# Patient Record
Sex: FEMALE
[De-identification: names, ages, dates, MRNs, and addresses within clinical notes are randomized per-mention and may not be internally consistent; named-entity substitution may affect disease eponyms.]

---

## 2021-02-22 ENCOUNTER — APPOINTMENT (OUTPATIENT)
Dept: OBGYN | Facility: CLINIC | Age: 61
End: 2021-02-22
Payer: MEDICAID

## 2021-02-22 VITALS
BODY MASS INDEX: 25.34 KG/M2 | HEIGHT: 63 IN | DIASTOLIC BLOOD PRESSURE: 85 MMHG | WEIGHT: 143 LBS | SYSTOLIC BLOOD PRESSURE: 190 MMHG | HEART RATE: 72 BPM | RESPIRATION RATE: 14 BRPM

## 2021-02-22 DIAGNOSIS — Z80.0 FAMILY HISTORY OF MALIGNANT NEOPLASM OF DIGESTIVE ORGANS: ICD-10-CM

## 2021-02-22 DIAGNOSIS — Z86.79 PERSONAL HISTORY OF OTHER DISEASES OF THE CIRCULATORY SYSTEM: ICD-10-CM

## 2021-02-22 DIAGNOSIS — Z83.3 FAMILY HISTORY OF DIABETES MELLITUS: ICD-10-CM

## 2021-02-22 DIAGNOSIS — R39.9 UNSPECIFIED SYMPTOMS AND SIGNS INVOLVING THE GENITOURINARY SYSTEM: ICD-10-CM

## 2021-02-22 PROBLEM — Z00.00 ENCOUNTER FOR PREVENTIVE HEALTH EXAMINATION: Status: ACTIVE | Noted: 2021-02-22

## 2021-02-22 LAB
BILIRUB UR QL STRIP: NEGATIVE
CLARITY UR: NORMAL
COLLECTION METHOD: NORMAL
GLUCOSE UR-MCNC: NEGATIVE
HCG UR QL: 0.2 EU/DL
HGB UR QL STRIP.AUTO: NORMAL
KETONES UR-MCNC: NEGATIVE
LEUKOCYTE ESTERASE UR QL STRIP: NORMAL
NITRITE UR QL STRIP: NEGATIVE
PH UR STRIP: 7
PROT UR STRIP-MCNC: 30
SP GR UR STRIP: 1.1

## 2021-02-22 PROCEDURE — 99213 OFFICE O/P EST LOW 20 MIN: CPT | Mod: 25

## 2021-02-22 PROCEDURE — 82270 OCCULT BLOOD FECES: CPT

## 2021-02-22 PROCEDURE — 99386 PREV VISIT NEW AGE 40-64: CPT

## 2021-02-22 PROCEDURE — 81003 URINALYSIS AUTO W/O SCOPE: CPT | Mod: QW

## 2021-02-22 PROCEDURE — 99072 ADDL SUPL MATRL&STAF TM PHE: CPT

## 2021-02-22 NOTE — HISTORY OF PRESENT ILLNESS
[FreeTextEntry1] : 61 yo no hot flashes, sexually active and has vaginal dryness SHe is also complaining of a lot of urinary urgency and burning x 1 week.  [Currently Active] : currently active [Men] : men [Vaginal] : vaginal [No] : No

## 2021-02-22 NOTE — PHYSICAL EXAM
[Appropriately responsive] : appropriately responsive [Alert] : alert [No Acute Distress] : no acute distress [No Lymphadenopathy] : no lymphadenopathy [No Murmurs] : no murmurs [Soft] : soft [Non-tender] : non-tender [Non-distended] : non-distended [No HSM] : No HSM [No Lesions] : no lesions [No Mass] : no mass [Oriented x3] : oriented x3 [Examination Of The Breasts] : a normal appearance [No Masses] : no breast masses were palpable [Labia Majora] : normal [Labia Minora] : normal [Normal] : normal [Uterine Adnexae] : normal [No Tenderness] : no tenderness [Nl Sphincter Tone] : normal sphincter tone [FreeTextEntry9] : -ciriloc,-mass

## 2021-02-25 LAB — HPV HIGH+LOW RISK DNA PNL CVX: NOT DETECTED

## 2022-02-28 ENCOUNTER — APPOINTMENT (OUTPATIENT)
Dept: OBGYN | Facility: CLINIC | Age: 62
End: 2022-02-28
Payer: MEDICAID

## 2022-02-28 VITALS — DIASTOLIC BLOOD PRESSURE: 118 MMHG | BODY MASS INDEX: 26.04 KG/M2 | WEIGHT: 147 LBS | SYSTOLIC BLOOD PRESSURE: 160 MMHG

## 2022-02-28 DIAGNOSIS — Z78.0 ASYMPTOMATIC MENOPAUSAL STATE: ICD-10-CM

## 2022-02-28 PROCEDURE — 99396 PREV VISIT EST AGE 40-64: CPT

## 2022-02-28 PROCEDURE — 82270 OCCULT BLOOD FECES: CPT

## 2022-02-28 RX ORDER — NITROFURANTOIN (MONOHYDRATE/MACROCRYSTALS) 25; 75 MG/1; MG/1
100 CAPSULE ORAL
Qty: 14 | Refills: 0 | Status: COMPLETED | COMMUNITY
Start: 2021-02-22 | End: 2022-02-28

## 2022-02-28 RX ORDER — PHENAZOPYRIDINE HYDROCHLORIDE 200 MG/1
200 TABLET ORAL 3 TIMES DAILY
Qty: 6 | Refills: 0 | Status: COMPLETED | COMMUNITY
Start: 2021-02-22 | End: 2022-02-28

## 2022-02-28 NOTE — PHYSICAL EXAM

## 2022-02-28 NOTE — HISTORY OF PRESENT ILLNESS
[Currently Active] : currently active [Men] : men [Vaginal] : vaginal [No] : No [Patient reported colonoscopy was normal] : Patient reported colonoscopy was normal [FreeTextEntry1] : 60 yo here for av  she notices that she has some urge incontinence occ, some vag dryness., uses coconut oil disc urogyn dr drake\par pt hasn’t been getting mammos.  [ColonoscopyDate] : 15 yrs ago

## 2022-02-28 NOTE — DISCUSSION/SUMMARY
[FreeTextEntry1] : disc with pt her elevated BP she should see Dr. West and disc this. She does natural things for the bp.\par Discussed with the pt the importance of exercise weight bearing,yoga, aerobics good variety, \par calcium totalling  mg between food and vitamins also vitamin D 2000iu daily, fish oil. \par ALso that any drop of blood after menopause is not good. Encouraged SBE\par FU in one year. \par  Discussed vaginal lubricants OTC like luvena moisturizers,ky ovule and relplense . There are also vaginal estrogens, and coconut oil with intercourse.\par discussed colonoscopy and derma appt.is going to make appt, \par

## 2022-12-01 ENCOUNTER — RX ONLY (RX ONLY)
Age: 62
End: 2022-12-01

## 2022-12-01 ENCOUNTER — OFFICE (OUTPATIENT)
Dept: URBAN - METROPOLITAN AREA CLINIC 104 | Facility: CLINIC | Age: 62
Setting detail: OPHTHALMOLOGY
End: 2022-12-01
Payer: MEDICAID

## 2022-12-01 DIAGNOSIS — H25.13: ICD-10-CM

## 2022-12-01 DIAGNOSIS — H43.393: ICD-10-CM

## 2022-12-01 DIAGNOSIS — H04.123: ICD-10-CM

## 2022-12-01 PROCEDURE — 83861 MICROFLUID ANALY TEARS: CPT | Performed by: SPECIALIST

## 2022-12-01 PROCEDURE — 99213 OFFICE O/P EST LOW 20 MIN: CPT | Performed by: SPECIALIST

## 2022-12-01 ASSESSMENT — KERATOMETRY
OD_AXISANGLE_DEGREES: 089
OS_AXISANGLE_DEGREES: 106
OS_K1POWER_DIOPTERS: 42.88
OD_K1POWER_DIOPTERS: 43.16
OD_K2POWER_DIOPTERS: 43.44
OS_K2POWER_DIOPTERS: 43.49

## 2022-12-01 ASSESSMENT — AXIALLENGTH_DERIVED
OS_AL: 22.9006
OD_AL: 23.23
OD_AL: 23.23
OS_AL: 22.9006

## 2022-12-01 ASSESSMENT — REFRACTION_MANIFEST
OS_ADD: +2.00
OS_SPHERE: +2.25
OD_AXIS: 085
OD_CYLINDER: -0.25
OS_VA1: 20/20
OS_CYLINDER: -0.25
OU_VA: 20/20
OD_VA1: 20/20
OD_ADD: +2.00
OS_AXIS: 070
OD_SPHERE: +1.25

## 2022-12-01 ASSESSMENT — REFRACTION_AUTOREFRACTION
OD_AXIS: 084
OD_CYLINDER: -0.25
OS_AXIS: 072
OS_SPHERE: +2.25
OD_SPHERE: +1.25
OS_CYLINDER: -0.25

## 2022-12-01 ASSESSMENT — SPHEQUIV_DERIVED
OD_SPHEQUIV: 1.125
OD_SPHEQUIV: 1.125
OS_SPHEQUIV: 2.125
OS_SPHEQUIV: 2.125

## 2022-12-01 ASSESSMENT — REFRACTION_CURRENTRX
OS_OVR_VA: 20/
OS_SPHERE: +1.75
OD_SPHERE: +1.75
OD_OVR_VA: 20/

## 2022-12-01 ASSESSMENT — CONFRONTATIONAL VISUAL FIELD TEST (CVF)
OD_FINDINGS: FULL
OS_FINDINGS: FULL

## 2022-12-01 ASSESSMENT — TEAR BREAK UP TIME (TBUT)
OS_TBUT: 1+
OD_TBUT: 1+

## 2022-12-01 ASSESSMENT — VISUAL ACUITY
OD_BCVA: 20/30-1
OS_BCVA: 20/50-1

## 2022-12-01 ASSESSMENT — TONOMETRY
OS_IOP_MMHG: 15
OD_IOP_MMHG: 15

## 2023-03-06 ENCOUNTER — APPOINTMENT (OUTPATIENT)
Dept: OBGYN | Facility: CLINIC | Age: 63
End: 2023-03-06

## 2023-04-10 ENCOUNTER — APPOINTMENT (OUTPATIENT)
Dept: OBGYN | Facility: CLINIC | Age: 63
End: 2023-04-10
Payer: MEDICAID

## 2023-04-10 VITALS
BODY MASS INDEX: 24.1 KG/M2 | DIASTOLIC BLOOD PRESSURE: 90 MMHG | HEART RATE: 70 BPM | SYSTOLIC BLOOD PRESSURE: 158 MMHG | HEIGHT: 63 IN | RESPIRATION RATE: 16 BRPM | WEIGHT: 136 LBS

## 2023-04-10 DIAGNOSIS — Z12.11 ENCOUNTER FOR SCREENING FOR MALIGNANT NEOPLASM OF COLON: ICD-10-CM

## 2023-04-10 DIAGNOSIS — N95.0 POSTMENOPAUSAL BLEEDING: ICD-10-CM

## 2023-04-10 PROCEDURE — 82270 OCCULT BLOOD FECES: CPT

## 2023-04-10 PROCEDURE — 99396 PREV VISIT EST AGE 40-64: CPT

## 2023-04-10 NOTE — HISTORY OF PRESENT ILLNESS
[FreeTextEntry1] : 63yo here for av never got jad and dexa , rx written today. \par No  bleeding in the last 1.5 ys she hasn’t had any spotting for a while now . + vaginal dryness. [Currently Active] : currently active

## 2023-04-10 NOTE — PHYSICAL EXAM
[Chaperone Declined] : Patient declined chaperone [Appropriately responsive] : appropriately responsive [Alert] : alert [No Acute Distress] : no acute distress [No Lymphadenopathy] : no lymphadenopathy [Soft] : soft [Non-tender] : non-tender [Non-distended] : non-distended [No HSM] : No HSM [No Lesions] : no lesions [No Mass] : no mass [Oriented x3] : oriented x3 [Examination Of The Breasts] : a normal appearance [No Masses] : no breast masses were palpable [Labia Majora] : normal [Labia Minora] : normal [Normal] : normal [Uterine Adnexae] : normal [No Tenderness] : no tenderness [Nl Sphincter Tone] : normal sphincter tone [FreeTextEntry9] : -ciriloc,-mass

## 2023-04-13 LAB — CYTOLOGY CVX/VAG DOC THIN PREP: ABNORMAL

## 2023-06-01 ENCOUNTER — OFFICE (OUTPATIENT)
Dept: URBAN - METROPOLITAN AREA CLINIC 104 | Facility: CLINIC | Age: 63
Setting detail: OPHTHALMOLOGY
End: 2023-06-01
Payer: MEDICAID

## 2023-06-01 ENCOUNTER — RX ONLY (RX ONLY)
Age: 63
End: 2023-06-01

## 2023-06-01 DIAGNOSIS — H43.813: ICD-10-CM

## 2023-06-01 DIAGNOSIS — H25.13: ICD-10-CM

## 2023-06-01 DIAGNOSIS — H04.123: ICD-10-CM

## 2023-06-01 PROCEDURE — 83861 MICROFLUID ANALY TEARS: CPT | Performed by: SPECIALIST

## 2023-06-01 PROCEDURE — 92014 COMPRE OPH EXAM EST PT 1/>: CPT | Performed by: SPECIALIST

## 2023-06-01 ASSESSMENT — KERATOMETRY
OD_AXISANGLE_DEGREES: 086
OS_K1POWER_DIOPTERS: 43.05
OS_K2POWER_DIOPTERS: 43.32
OD_K2POWER_DIOPTERS: 43.66
OD_K1POWER_DIOPTERS: 43.10
OS_AXISANGLE_DEGREES: 095

## 2023-06-01 ASSESSMENT — REFRACTION_CURRENTRX
OS_OVR_VA: 20/
OS_SPHERE: +2.50
OD_OVR_VA: 20/
OD_SPHERE: +2.50

## 2023-06-01 ASSESSMENT — VISUAL ACUITY
OD_BCVA: 20/80
OS_BCVA: 20/40+

## 2023-06-01 ASSESSMENT — TEAR BREAK UP TIME (TBUT)
OD_TBUT: 2+
OS_TBUT: 2+

## 2023-06-01 ASSESSMENT — AXIALLENGTH_DERIVED
OD_AL: 23.2
OS_AL: 22.7637

## 2023-06-01 ASSESSMENT — REFRACTION_AUTOREFRACTION
OD_CYLINDER: -0.75
OD_SPHERE: +1.50
OD_AXIS: 091
OS_AXIS: 088
OS_SPHERE: +2.75
OS_CYLINDER: -0.50

## 2023-06-01 ASSESSMENT — TONOMETRY
OD_IOP_MMHG: 17
OS_IOP_MMHG: 17

## 2023-06-01 ASSESSMENT — CONFRONTATIONAL VISUAL FIELD TEST (CVF)
OS_FINDINGS: FULL
OD_FINDINGS: FULL

## 2023-06-01 ASSESSMENT — SPHEQUIV_DERIVED
OD_SPHEQUIV: 1.125
OS_SPHEQUIV: 2.5

## 2024-04-22 ENCOUNTER — APPOINTMENT (OUTPATIENT)
Dept: OBGYN | Facility: CLINIC | Age: 64
End: 2024-04-22
Payer: MEDICAID

## 2024-04-22 VITALS
WEIGHT: 144 LBS | HEIGHT: 63 IN | SYSTOLIC BLOOD PRESSURE: 128 MMHG | BODY MASS INDEX: 25.52 KG/M2 | DIASTOLIC BLOOD PRESSURE: 80 MMHG

## 2024-04-22 DIAGNOSIS — N39.41 URGE INCONTINENCE: ICD-10-CM

## 2024-04-22 DIAGNOSIS — R92.30 DENSE BREASTS, UNSPECIFIED: ICD-10-CM

## 2024-04-22 DIAGNOSIS — Z12.31 ENCOUNTER FOR SCREENING MAMMOGRAM FOR MALIGNANT NEOPLASM OF BREAST: ICD-10-CM

## 2024-04-22 DIAGNOSIS — Z01.419 ENCOUNTER FOR GYNECOLOGICAL EXAMINATION (GENERAL) (ROUTINE) W/OUT ABNORMAL FINDINGS: ICD-10-CM

## 2024-04-22 DIAGNOSIS — N95.2 POSTMENOPAUSAL ATROPHIC VAGINITIS: ICD-10-CM

## 2024-04-22 PROCEDURE — 99386 PREV VISIT NEW AGE 40-64: CPT

## 2024-04-22 RX ORDER — ESTRADIOL 10 UG/1
10 TABLET VAGINAL
Qty: 30 | Refills: 2 | Status: COMPLETED | COMMUNITY
Start: 2022-02-28 | End: 2024-04-22

## 2024-04-22 NOTE — HISTORY OF PRESENT ILLNESS
[FreeTextEntry1] : 62 yo here for av, she had a mammo 1 month ago. from PMD no smoking,  Answered any questions she may have.Vag dryness but afraid to use the vagifem.  disc other options [Patient reported colonoscopy was normal] : Patient reported colonoscopy was normal [Mammogramdate] : 1 yr ago [ColonoscopyDate] : 15 yrs ago [Currently Active] : currently active [Men] : men [Vaginal] : vaginal [No] : No

## 2024-10-29 ENCOUNTER — OFFICE (OUTPATIENT)
Dept: URBAN - METROPOLITAN AREA CLINIC 104 | Facility: CLINIC | Age: 64
Setting detail: OPHTHALMOLOGY
End: 2024-10-29
Payer: COMMERCIAL

## 2024-10-29 DIAGNOSIS — H43.813: ICD-10-CM

## 2024-10-29 DIAGNOSIS — H04.123: ICD-10-CM

## 2024-10-29 DIAGNOSIS — H25.13: ICD-10-CM

## 2024-10-29 PROCEDURE — 99213 OFFICE O/P EST LOW 20 MIN: CPT | Mod: 25 | Performed by: SPECIALIST

## 2024-10-29 PROCEDURE — 68761 CLOSE TEAR DUCT OPENING: CPT | Mod: 50 | Performed by: SPECIALIST

## 2024-10-29 ASSESSMENT — KERATOMETRY
OS_K1POWER_DIOPTERS: 42.94
OD_K1POWER_DIOPTERS: 43.16
OS_K2POWER_DIOPTERS: 43.60
OD_K2POWER_DIOPTERS: 43.77
OD_AXISANGLE_DEGREES: 076
OS_AXISANGLE_DEGREES: 106

## 2024-10-29 ASSESSMENT — PUNCTA - ASSESSMENT
OS_PUNCTA: 3MON PLUG COL PLUG LLL
OD_PUNCTA: 3MON PLUG COL PLUG RLL

## 2024-10-29 ASSESSMENT — REFRACTION_AUTOREFRACTION
OD_SPHERE: +2.50
OS_CYLINDER: -0.75
OD_AXIS: 084
OD_CYLINDER: -1.25
OS_SPHERE: +3.25
OS_AXIS: 095

## 2024-10-29 ASSESSMENT — REFRACTION_CURRENTRX
OS_SPHERE: +2.50
OD_OVR_VA: 20/
OS_OVR_VA: 20/
OD_SPHERE: +2.50

## 2024-10-29 ASSESSMENT — TONOMETRY
OS_IOP_MMHG: 17
OD_IOP_MMHG: 17

## 2024-10-29 ASSESSMENT — TEAR BREAK UP TIME (TBUT)
OS_TBUT: 2+
OD_TBUT: 2+

## 2024-10-29 ASSESSMENT — VISUAL ACUITY
OD_BCVA: 20/20-2
OS_BCVA: 20/20-2

## 2024-10-29 ASSESSMENT — CONFRONTATIONAL VISUAL FIELD TEST (CVF)
OD_FINDINGS: FULL
OS_FINDINGS: FULL

## 2025-01-14 ENCOUNTER — OFFICE (OUTPATIENT)
Dept: URBAN - METROPOLITAN AREA CLINIC 104 | Facility: CLINIC | Age: 65
Setting detail: OPHTHALMOLOGY
End: 2025-01-14
Payer: COMMERCIAL

## 2025-01-14 DIAGNOSIS — H04.122: ICD-10-CM

## 2025-01-14 DIAGNOSIS — H25.13: ICD-10-CM

## 2025-01-14 DIAGNOSIS — H04.121: ICD-10-CM

## 2025-01-14 DIAGNOSIS — H43.813: ICD-10-CM

## 2025-01-14 PROCEDURE — 83861 MICROFLUID ANALY TEARS: CPT | Mod: LT | Performed by: SPECIALIST

## 2025-01-14 PROCEDURE — 83861 MICROFLUID ANALY TEARS: CPT | Mod: RT | Performed by: SPECIALIST

## 2025-01-14 PROCEDURE — 99213 OFFICE O/P EST LOW 20 MIN: CPT | Performed by: SPECIALIST

## 2025-01-14 ASSESSMENT — KERATOMETRY
OD_K2POWER_DIOPTERS: 43.60
OD_AXISANGLE_DEGREES: 68
OD_K1POWER_DIOPTERS: 43.16
OS_AXISANGLE_DEGREES: 116
OS_K1POWER_DIOPTERS: 42.94
OS_K2POWER_DIOPTERS: 43.21

## 2025-01-14 ASSESSMENT — TEAR BREAK UP TIME (TBUT)
OS_TBUT: 1+
OD_TBUT: 1+

## 2025-01-14 ASSESSMENT — REFRACTION_MANIFEST
OS_AXIS: 90
OD_CYLINDER: -0.75
OD_VA1: 20/20-1
OD_ADD: +2.50
OS_VA1: 20/20-2
OS_CYLINDER: -0.75
OD_AXIS: 75
OS_SPHERE: +2.00
OS_ADD: +2.50
OD_SPHERE: +2.00
OS_VA2: 20/20
OD_VA2: 20/20

## 2025-01-14 ASSESSMENT — REFRACTION_CURRENTRX
OS_OVR_VA: 20/
OS_SPHERE: +1.75
OD_OVR_VA: 20/
OS_ADD: +2.00
OD_SPHERE: +1.00
OD_ADD: +2.00

## 2025-01-14 ASSESSMENT — PUNCTA - ASSESSMENT
OD_PUNCTA: 3MON PLUG COL PLUG RLL
OS_PUNCTA: 3MON PLUG COL PLUG LLL

## 2025-01-14 ASSESSMENT — REFRACTION_AUTOREFRACTION
OD_SPHERE: +2.50
OD_CYLINDER: -1.25
OS_CYLINDER: -0.75
OS_SPHERE: +3.25
OD_AXIS: 76
OS_AXIS: 88

## 2025-01-14 ASSESSMENT — VISUAL ACUITY
OD_BCVA: 20/25
OS_BCVA: 20/25

## 2025-01-14 ASSESSMENT — CONFRONTATIONAL VISUAL FIELD TEST (CVF)
OD_FINDINGS: FULL
OS_FINDINGS: FULL

## 2025-06-02 ENCOUNTER — TRANSCRIPTION ENCOUNTER (OUTPATIENT)
Age: 65
End: 2025-06-02

## 2025-06-02 ENCOUNTER — NON-APPOINTMENT (OUTPATIENT)
Age: 65
End: 2025-06-02

## 2025-06-02 ENCOUNTER — APPOINTMENT (OUTPATIENT)
Dept: OBGYN | Facility: CLINIC | Age: 65
End: 2025-06-02
Payer: MEDICAID

## 2025-06-02 VITALS
SYSTOLIC BLOOD PRESSURE: 134 MMHG | RESPIRATION RATE: 18 BRPM | HEART RATE: 76 BPM | HEIGHT: 63 IN | DIASTOLIC BLOOD PRESSURE: 76 MMHG | WEIGHT: 136 LBS | BODY MASS INDEX: 24.1 KG/M2

## 2025-06-02 DIAGNOSIS — Z12.11 ENCOUNTER FOR SCREENING FOR MALIGNANT NEOPLASM OF COLON: ICD-10-CM

## 2025-06-02 DIAGNOSIS — Z80.3 FAMILY HISTORY OF MALIGNANT NEOPLASM OF BREAST: ICD-10-CM

## 2025-06-02 DIAGNOSIS — Z12.31 ENCOUNTER FOR SCREENING MAMMOGRAM FOR MALIGNANT NEOPLASM OF BREAST: ICD-10-CM

## 2025-06-02 DIAGNOSIS — R92.30 DENSE BREASTS, UNSPECIFIED: ICD-10-CM

## 2025-06-02 DIAGNOSIS — N39.41 URGE INCONTINENCE: ICD-10-CM

## 2025-06-02 DIAGNOSIS — Z01.419 ENCOUNTER FOR GYNECOLOGICAL EXAMINATION (GENERAL) (ROUTINE) W/OUT ABNORMAL FINDINGS: ICD-10-CM

## 2025-06-02 DIAGNOSIS — Z13.820 ENCOUNTER FOR SCREENING FOR OSTEOPOROSIS: ICD-10-CM

## 2025-06-02 DIAGNOSIS — N95.2 POSTMENOPAUSAL ATROPHIC VAGINITIS: ICD-10-CM

## 2025-06-02 LAB
CARD LOT #: NORMAL
CARD LOT EXP DATE: NORMAL
DATE COLLECTED: NORMAL
DATE COLLECTED: NORMAL
DEVELOPER LOT #: NORMAL
DEVELOPER LOT EXP DATE: NORMAL
HEMOCCULT 2: NEGATIVE
HEMOCCULT SP1 STL QL: NEGATIVE
QUALITY CONTROL: YES
QUALITY CONTROL: YES

## 2025-06-02 PROCEDURE — 99396 PREV VISIT EST AGE 40-64: CPT | Mod: 25

## 2025-06-02 PROCEDURE — 82270 OCCULT BLOOD FECES: CPT

## 2025-06-03 LAB — HPV HIGH+LOW RISK DNA PNL CVX: NOT DETECTED

## 2025-06-09 LAB — CYTOLOGY CVX/VAG DOC THIN PREP: NORMAL

## 2025-07-29 ENCOUNTER — OFFICE (OUTPATIENT)
Dept: URBAN - METROPOLITAN AREA CLINIC 104 | Facility: CLINIC | Age: 65
Setting detail: OPHTHALMOLOGY
End: 2025-07-29
Payer: COMMERCIAL

## 2025-07-29 DIAGNOSIS — H04.121: ICD-10-CM

## 2025-07-29 DIAGNOSIS — H25.13: ICD-10-CM

## 2025-07-29 DIAGNOSIS — H43.813: ICD-10-CM

## 2025-07-29 DIAGNOSIS — H04.122: ICD-10-CM

## 2025-07-29 PROCEDURE — 83861 MICROFLUID ANALY TEARS: CPT | Mod: LT | Performed by: SPECIALIST

## 2025-07-29 PROCEDURE — 83861 MICROFLUID ANALY TEARS: CPT | Mod: RT | Performed by: SPECIALIST

## 2025-07-29 PROCEDURE — 92012 INTRM OPH EXAM EST PATIENT: CPT | Performed by: SPECIALIST

## 2025-07-29 ASSESSMENT — TEAR BREAK UP TIME (TBUT)
OS_TBUT: 1+
OD_TBUT: 1+

## 2025-07-29 ASSESSMENT — REFRACTION_CURRENTRX
OS_OVR_VA: 20/
OD_OVR_VA: 20/

## 2025-07-29 ASSESSMENT — CONFRONTATIONAL VISUAL FIELD TEST (CVF)
OD_FINDINGS: FULL
OS_FINDINGS: FULL

## 2025-07-29 ASSESSMENT — VISUAL ACUITY
OS_BCVA: 20/25
OD_BCVA: 20/25

## 2025-07-29 ASSESSMENT — PUNCTA - ASSESSMENT
OD_PUNCTA: 3MON PLUG COL PLUG RLL
OS_PUNCTA: 3MON PLUG COL PLUG LLL

## 2025-07-29 ASSESSMENT — TONOMETRY
OS_IOP_MMHG: 14
OD_IOP_MMHG: 14